# Patient Record
Sex: MALE | Race: WHITE
[De-identification: names, ages, dates, MRNs, and addresses within clinical notes are randomized per-mention and may not be internally consistent; named-entity substitution may affect disease eponyms.]

---

## 2017-12-16 ENCOUNTER — HOSPITAL ENCOUNTER (EMERGENCY)
Dept: HOSPITAL 45 - C.EDB | Age: 47
Discharge: HOME | End: 2017-12-16
Payer: COMMERCIAL

## 2017-12-16 VITALS
BODY MASS INDEX: 28.58 KG/M2 | HEIGHT: 67.01 IN | HEIGHT: 67.01 IN | WEIGHT: 182.1 LBS | BODY MASS INDEX: 28.58 KG/M2 | WEIGHT: 182.1 LBS

## 2017-12-16 VITALS — HEART RATE: 91 BPM | DIASTOLIC BLOOD PRESSURE: 90 MMHG | OXYGEN SATURATION: 98 % | SYSTOLIC BLOOD PRESSURE: 140 MMHG

## 2017-12-16 VITALS — TEMPERATURE: 98.42 F

## 2017-12-16 DIAGNOSIS — S60.221A: ICD-10-CM

## 2017-12-16 DIAGNOSIS — R40.2412: ICD-10-CM

## 2017-12-16 DIAGNOSIS — S01.81XA: Primary | ICD-10-CM

## 2017-12-16 DIAGNOSIS — S60.222A: ICD-10-CM
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
## 2017-12-16 DIAGNOSIS — R03.0: ICD-10-CM
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
## 2017-12-22 ENCOUNTER — HOSPITAL ENCOUNTER (EMERGENCY)
Dept: HOSPITAL 45 - C.EDB | Age: 47
Discharge: HOME | End: 2017-12-22
Payer: COMMERCIAL

## 2017-12-22 VITALS
SYSTOLIC BLOOD PRESSURE: 125 MMHG | DIASTOLIC BLOOD PRESSURE: 86 MMHG | OXYGEN SATURATION: 97 % | HEART RATE: 76 BPM | TEMPERATURE: 98.24 F

## 2017-12-22 VITALS
WEIGHT: 179.9 LBS | HEIGHT: 67.01 IN | BODY MASS INDEX: 28.24 KG/M2 | HEIGHT: 67.01 IN | WEIGHT: 179.9 LBS | BODY MASS INDEX: 28.24 KG/M2

## 2017-12-22 DIAGNOSIS — Z48.02: ICD-10-CM
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
## 2017-12-22 DIAGNOSIS — S01.81XD: Primary | ICD-10-CM

## 2017-12-22 NOTE — EMERGENCY ROOM VISIT NOTE
History


First contact with patient:  07:03


Chief Complaint:  SUTURE/STAPLE REMOVAL


Stated Complaint:  STITCH REMOVAL


Nursing Triage Summary:  


pt has sutures in forehead has been 6 days . 8 sutures





History of Present Illness


The patient is a 47 year old male who presents to the Emergency Room for suture 

removal from a forehead laceration that was repaired by me 6 days ago.  The 

patient denies any wound complications.





Review of Systems


Noncontributory





Past Medical/Surgical History


Medical Problems:


(1) No significant past medical history


Surgical Problems:


(1) No history of previous surgery








Family History





No significant family history





Social History


Smoking Status:  Never Smoker


Alcohol Use:  occasionally


Marital Status:  single


Occupation Status:  employed





Current/Historical Medications


No Active Prescriptions or Reported Meds





Physical Exam


Vital Signs











  Date Time  Temp Pulse Resp B/P (MAP) Pulse Ox O2 Delivery O2 Flow Rate FiO2


 


12/22/17 07:00 36.8 76 18 125/86 97 Room Air  











Physical Exam


HEENT: Examination shows a well-healed laceration without any overriding 

erythema, fluctuance, drainage or diastases.  All sutures were removed without 

any complications.





Medical Decision & Procedures


ED Course


The patient was provided additional verbal wound care instructions, including 

use of vitamin E oil and a high SPF factor sunblock to minimize scar darkening.





Medical Decision








Blood Pressure Screening


Patient's blood pressure:  Normal blood pressure





Impression





 Primary Impression:  


 Encounter for removal of sutures


 Additional Impression:  


 Forehead laceration





Departure Information


Prescriptions





No Active Prescriptions or Reported Meds





Referrals


Molina Palacios M.D. (PCP)





Patient Instructions


[facility]





Problem Qualifiers








 Additional Impression:  


 Forehead laceration


 Encounter type:  subsequent encounter  Qualified Codes:  S01.81XD - Laceration 

without foreign body of other part of head, subsequent encounter

## 2018-07-20 ENCOUNTER — HOSPITAL ENCOUNTER (OUTPATIENT)
Dept: HOSPITAL 45 - C.LAB1850 | Age: 48
Discharge: HOME | End: 2018-07-20
Attending: INTERNAL MEDICINE
Payer: COMMERCIAL

## 2018-07-20 DIAGNOSIS — E78.00: Primary | ICD-10-CM

## 2018-07-20 LAB
BASOPHILS # BLD: 0.02 K/UL (ref 0–0.2)
BASOPHILS NFR BLD: 0.3 %
BUN SERPL-MCNC: 23 MG/DL (ref 7–18)
CALCIUM SERPL-MCNC: 8.7 MG/DL (ref 8.5–10.1)
CO2 SERPL-SCNC: 27 MMOL/L (ref 21–32)
CREAT SERPL-MCNC: 1.22 MG/DL (ref 0.6–1.4)
EOS ABS #: 0.03 K/UL (ref 0–0.5)
EOSINOPHIL NFR BLD AUTO: 242 K/UL (ref 130–400)
GLUCOSE SERPL-MCNC: 83 MG/DL (ref 70–99)
HBA1C MFR BLD: 5.5 % (ref 4.5–5.6)
HCT VFR BLD CALC: 46.5 % (ref 42–52)
HGB BLD-MCNC: 15.8 G/DL (ref 14–18)
IG#: 0.01 K/UL (ref 0–0.02)
IMM GRANULOCYTES NFR BLD AUTO: 14.9 %
KETONES UR QL STRIP: 179 MG/DL
LYMPHOCYTES # BLD: 0.94 K/UL (ref 1.2–3.4)
MCH RBC QN AUTO: 31.3 PG (ref 25–34)
MCHC RBC AUTO-ENTMCNC: 34 G/DL (ref 32–36)
MCV RBC AUTO: 92.3 FL (ref 80–100)
MONO ABS #: 0.55 K/UL (ref 0.11–0.59)
MONOCYTES NFR BLD: 8.7 %
NEUT ABS #: 4.77 K/UL (ref 1.4–6.5)
NEUTROPHILS # BLD AUTO: 0.5 %
NEUTROPHILS NFR BLD AUTO: 75.4 %
PH UR: 246 MG/DL (ref 0–200)
PMV BLD AUTO: 11.1 FL (ref 7.4–10.4)
POTASSIUM SERPL-SCNC: 3.8 MMOL/L (ref 3.5–5.1)
RED CELL DISTRIBUTION WIDTH CV: 12.5 % (ref 11.5–14.5)
RED CELL DISTRIBUTION WIDTH SD: 42.3 FL (ref 36.4–46.3)
SODIUM SERPL-SCNC: 139 MMOL/L (ref 136–145)
WBC # BLD AUTO: 6.32 K/UL (ref 4.8–10.8)